# Patient Record
Sex: MALE | Race: WHITE | Employment: OTHER | URBAN - METROPOLITAN AREA
[De-identification: names, ages, dates, MRNs, and addresses within clinical notes are randomized per-mention and may not be internally consistent; named-entity substitution may affect disease eponyms.]

---

## 2019-11-27 ENCOUNTER — HOSPITAL ENCOUNTER (OUTPATIENT)
Age: 84
Discharge: HOME | End: 2019-11-27
Attending: FAMILY MEDICINE
Payer: MEDICARE

## 2019-11-27 VITALS
DIASTOLIC BLOOD PRESSURE: 77 MMHG | OXYGEN SATURATION: 99 % | TEMPERATURE: 99 F | HEIGHT: 69 IN | WEIGHT: 165 LBS | HEART RATE: 85 BPM | BODY MASS INDEX: 24.44 KG/M2 | RESPIRATION RATE: 20 BRPM | SYSTOLIC BLOOD PRESSURE: 145 MMHG

## 2019-11-27 DIAGNOSIS — S51.812A SKIN TEAR OF LEFT FOREARM WITHOUT COMPLICATION, INITIAL ENCOUNTER: Primary | ICD-10-CM

## 2019-11-27 PROCEDURE — 99201 HC OUTPT EVAL AND MGNT NEW PT LEVEL 1: CPT

## 2019-11-27 PROCEDURE — 99201 PR OUTPT EVAL AND MGNT NEW PT LEVEL 1: CPT

## 2019-11-27 RX ORDER — TAMSULOSIN HYDROCHLORIDE 0.4 MG/1
CAPSULE ORAL DAILY
COMMUNITY

## 2019-11-27 NOTE — ED PROVIDER NOTES
Patient Seen in: [id number] [facility] [place]      History   Patient presents with:  Fall  Wound    Stated Complaint: left arm injury/fall at home    HPI    25-year-old male presents today for wound check.   Patient states he sustained a fall on the gr powder that was dated next to the skin was also removed. Wound dressing was done. Good cap refill, pulses, sensation. Wound dressing applied. ED Course   Labs Reviewed - No data to display               MDM     Wound healing well.   Wound healing by s

## 2019-11-27 NOTE — ED INITIAL ASSESSMENT (HPI)
Patient was outside about 1 week ago at his home and fell on the grass and debris. He injured his left forearm and put on a powder for coagulation to stop the bleeding. His arm is now swollen and the wound isn't healing well.

## 2021-09-20 ENCOUNTER — OFFICE VISIT (OUTPATIENT)
Dept: URBAN - METROPOLITAN AREA CLINIC 19 | Facility: CLINIC | Age: 86
End: 2021-09-20

## 2022-07-07 ENCOUNTER — OUT OF OFFICE VISIT (OUTPATIENT)
Dept: URBAN - METROPOLITAN AREA MEDICAL CENTER 25 | Facility: MEDICAL CENTER | Age: 87
End: 2022-07-07
Payer: OTHER GOVERNMENT

## 2022-07-07 DIAGNOSIS — R63.8 ALTERATION IN APPETITE: ICD-10-CM

## 2022-07-07 DIAGNOSIS — K62.5 ANAL BLEEDING: ICD-10-CM

## 2022-07-07 DIAGNOSIS — C22.9 ADENOCARCINOMA DETERMINED BY BIOPSY OF LIVER: ICD-10-CM

## 2022-07-07 DIAGNOSIS — R19.7 ACUTE DIARRHEA: ICD-10-CM

## 2022-07-07 DIAGNOSIS — A04.72 C. DIFFICILE: ICD-10-CM

## 2022-07-07 PROCEDURE — G8427 DOCREV CUR MEDS BY ELIG CLIN: HCPCS | Performed by: INTERNAL MEDICINE

## 2022-07-07 PROCEDURE — 99222 1ST HOSP IP/OBS MODERATE 55: CPT | Performed by: INTERNAL MEDICINE

## 2022-07-07 PROCEDURE — 99232 SBSQ HOSP IP/OBS MODERATE 35: CPT | Performed by: INTERNAL MEDICINE
